# Patient Record
Sex: FEMALE | Race: WHITE | URBAN - NONMETROPOLITAN AREA
[De-identification: names, ages, dates, MRNs, and addresses within clinical notes are randomized per-mention and may not be internally consistent; named-entity substitution may affect disease eponyms.]

---

## 2019-08-07 ENCOUNTER — NEW PATIENT (OUTPATIENT)
Dept: URBAN - NONMETROPOLITAN AREA CLINIC 12 | Facility: CLINIC | Age: 84
End: 2019-08-07
Payer: MEDICARE

## 2019-08-07 PROCEDURE — 92004 COMPRE OPH EXAM NEW PT 1/>: CPT | Performed by: OPTOMETRIST

## 2019-08-07 PROCEDURE — 92014 COMPRE OPH EXAM EST PT 1/>: CPT | Performed by: OPTOMETRIST

## 2019-08-07 ASSESSMENT — INTRAOCULAR PRESSURE
OS: 10
OD: 11

## 2020-06-25 ENCOUNTER — FOLLOW UP ESTABLISHED (OUTPATIENT)
Dept: URBAN - NONMETROPOLITAN AREA CLINIC 15 | Facility: CLINIC | Age: 85
End: 2020-06-25
Payer: MEDICARE

## 2020-06-25 DIAGNOSIS — H16.223 KERATOCONJUNCT SICCA, NOT SPECIFIED AS SJOGREN'S, BILATERAL: ICD-10-CM

## 2020-06-25 DIAGNOSIS — Z96.1 PRESENCE OF INTRAOCULAR LENS: Primary | ICD-10-CM

## 2020-06-25 PROCEDURE — 92014 COMPRE OPH EXAM EST PT 1/>: CPT | Performed by: OPTOMETRIST

## 2020-06-25 ASSESSMENT — INTRAOCULAR PRESSURE
OD: 14
OS: 10

## 2023-04-25 ENCOUNTER — OFFICE VISIT (OUTPATIENT)
Dept: URBAN - NONMETROPOLITAN AREA CLINIC 14 | Facility: CLINIC | Age: 88
End: 2023-04-25
Payer: MEDICARE

## 2023-04-25 DIAGNOSIS — H53.2 DIPLOPIA: Primary | ICD-10-CM

## 2023-04-25 PROCEDURE — 99213 OFFICE O/P EST LOW 20 MIN: CPT | Performed by: OPTOMETRIST

## 2023-04-25 ASSESSMENT — INTRAOCULAR PRESSURE
OS: 15
OD: 15

## 2023-04-25 NOTE — IMPRESSION/PLAN
Impression: Diplopia: H53.2. Plan: Only notices when tired. Will monitor for now. Discussed possible surgery and glasses. Patient will call back when she decides how she wants to proceed.